# Patient Record
Sex: FEMALE | Race: WHITE | NOT HISPANIC OR LATINO | Employment: OTHER | URBAN - NONMETROPOLITAN AREA
[De-identification: names, ages, dates, MRNs, and addresses within clinical notes are randomized per-mention and may not be internally consistent; named-entity substitution may affect disease eponyms.]

---

## 2017-02-02 ENCOUNTER — OFFICE VISIT (OUTPATIENT)
Dept: OBSTETRICS AND GYNECOLOGY | Facility: CLINIC | Age: 82
End: 2017-02-02

## 2017-02-02 VITALS
BODY MASS INDEX: 18.55 KG/M2 | DIASTOLIC BLOOD PRESSURE: 70 MMHG | HEIGHT: 59 IN | SYSTOLIC BLOOD PRESSURE: 110 MMHG | WEIGHT: 92 LBS

## 2017-02-02 DIAGNOSIS — N99.3 VAGINAL VAULT PROLAPSE AFTER HYSTERECTOMY: ICD-10-CM

## 2017-02-02 DIAGNOSIS — Z46.89 ENCOUNTER FOR PESSARY MAINTENANCE: Primary | ICD-10-CM

## 2017-02-02 PROCEDURE — 99213 OFFICE O/P EST LOW 20 MIN: CPT | Performed by: NURSE PRACTITIONER

## 2017-02-02 RX ORDER — MELOXICAM 7.5 MG/1
TABLET ORAL
Refills: 5 | COMMUNITY
Start: 2017-01-19

## 2017-02-02 NOTE — PROGRESS NOTES
"    María Elena Olmstead is a 89 y.o. No obstetric history on file.     Chief Complaint   Patient presents with   • Pessary Check     Pt is here today for Pessary Maintence           HPI - María Elena Albarado is in today for pessary maintenance.  She wears a Patten folding  Pessary and  is doing well with her pessary.  She is   In [facility] from being in the hospital.      The following portions of the patient's history were reviewed and updated as appropriate:vital signs, allergies, current medications, past family history, past medical history, past social history, past surgical history and problem list.    Review of Systems   Constitutional: Positive for activity change and fatigue. Negative for diaphoresis.   HENT: Negative for congestion, ear discharge and postnasal drip.    Eyes: Negative for redness and itching.   Respiratory: Negative for apnea and choking.    Cardiovascular: Negative for chest pain.   Gastrointestinal: Negative for abdominal distention.        Recent c dif   Endocrine: Negative for cold intolerance and heat intolerance.   Genitourinary: Negative for difficulty urinating, enuresis, genital sores, pelvic pain, vaginal bleeding and vaginal pain.   Musculoskeletal: Positive for arthralgias, gait problem, neck pain and neck stiffness.        Severe  rheumatoid   Neurological: Negative for dizziness, tremors and speech difficulty.   Psychiatric/Behavioral: Negative for agitation and dysphoric mood. The patient is not nervous/anxious.                 Visit Vitals   • /70 (BP Location: Right arm, Patient Position: Sitting, Cuff Size: Adult)   • Ht 59\" (149.9 cm)   • Wt 92 lb (41.7 kg)   • Breastfeeding No   • BMI 18.58 kg/m2         Physical Exam   Constitutional: She is oriented to person, place, and time. She appears well-developed and well-nourished. She has a sickly appearance. No distress.   Frail   HENT:   Head: Normocephalic and atraumatic.   Eyes: Conjunctivae are normal. Left " eye exhibits no discharge. No scleral icterus.   Neck: Normal range of motion. Neck supple. No thyromegaly present.   Cardiovascular: Normal rate and regular rhythm.    No murmur heard.  Pulmonary/Chest: Effort normal and breath sounds normal. No respiratory distress. She has no wheezes.   Abdominal: Soft. She exhibits no distension and no mass. There is no tenderness. There is no guarding. Hernia confirmed negative in the right inguinal area and confirmed negative in the left inguinal area.   Genitourinary: Rectal exam shows no mass. Pelvic exam was performed with patient supine. There is no rash, tenderness or lesion on the right labia. There is no rash, tenderness or lesion on the left labia. Right adnexum displays no mass and no tenderness. Left adnexum displays no mass and no tenderness. There is erythema in the vagina. No foreign body in the vagina. No signs of injury around the vagina. No vaginal discharge found.   Genitourinary Comments: B/S/U  Without lesions  Urethra atrophic and  Relaxed  Perineum  Normal  Urethral meatus atrophic   Vagina  Relaxed , no lesions  Rectum  No masses  Bladder  nontender    Vaginal vault prolaps   Musculoskeletal: She exhibits edema, tenderness and deformity.   Neurological: She is alert and oriented to person, place, and time.   Skin: Skin is warm and dry. She is not diaphoretic.   Psychiatric: Her mood appears not anxious. Her affect is not blunt, not labile and not inappropriate. She does not exhibit a depressed mood.   Nursing note and vitals reviewed.       Assessment/Plan     Diagnoses and all orders for this visit:    Encounter for pessary maintenance   (SUZIE)    Vaginal vault prolapse after hysterectomy    BMI less than 19,adult    Patient is seen for pessary main.  Patient denies any problems.  There no areas of excoriation, there are no lesion.  The tissues are atrophic.  Pessary is removed.  The vault is cleaned with water  Pessary is reinserted.    rto 3   elif Bolden, APRN  2/2/2017

## 2017-02-16 RX ORDER — CONJUGATED ESTROGENS 0.62 MG/G
CREAM VAGINAL
Qty: 30 G | Refills: 3 | Status: SHIPPED | OUTPATIENT
Start: 2017-02-16 | End: 2017-05-12 | Stop reason: SDUPTHER

## 2017-03-01 ENCOUNTER — HOSPITAL ENCOUNTER (OUTPATIENT)
Dept: CT IMAGING | Age: 82
Discharge: HOME | End: 2017-03-01
Payer: MEDICARE

## 2017-03-01 DIAGNOSIS — R42 DIZZINESS: ICD-10-CM

## 2017-03-01 DIAGNOSIS — R51.9 PRESSURE IN HEAD: ICD-10-CM

## 2017-03-01 PROCEDURE — 70450 CT HEAD/BRAIN W/O DYE: CPT

## 2017-03-13 ENCOUNTER — OFFICE VISIT (OUTPATIENT)
Dept: OTOLARYNGOLOGY | Facility: CLINIC | Age: 82
End: 2017-03-13

## 2017-03-13 VITALS
SYSTOLIC BLOOD PRESSURE: 110 MMHG | WEIGHT: 97 LBS | TEMPERATURE: 98 F | HEART RATE: 80 BPM | BODY MASS INDEX: 19.56 KG/M2 | DIASTOLIC BLOOD PRESSURE: 80 MMHG | HEIGHT: 59 IN

## 2017-03-13 DIAGNOSIS — H90.5 HEARING LOSS, SENSORINEURAL: ICD-10-CM

## 2017-03-13 DIAGNOSIS — T41.3X1A: Primary | ICD-10-CM

## 2017-03-13 DIAGNOSIS — H81.10 BPPV (BENIGN PAROXYSMAL POSITIONAL VERTIGO), UNSPECIFIED LATERALITY: ICD-10-CM

## 2017-03-13 DIAGNOSIS — R42 VERTIGO: ICD-10-CM

## 2017-03-13 PROCEDURE — 99213 OFFICE O/P EST LOW 20 MIN: CPT | Performed by: PHYSICIAN ASSISTANT

## 2017-03-13 RX ORDER — GABAPENTIN 100 MG/1
CAPSULE ORAL
Refills: 4 | COMMUNITY
Start: 2017-02-28 | End: 2017-10-17

## 2017-03-13 NOTE — PROGRESS NOTES
Patient Care Team:  Kindra Day MD as PCP - General (Internal Medicine)    Chief Complaint   Patient presents with   • Follow-up     ears, cerumen        Subjective     María Elena Olmstead is a 89 y.o. female who presents for evaluation.  HPI Comments: Patient presents for follow-up evaluation of ears and cerumen check. The patient was last seen in the office four months ago for a routine ear cleaning and had been doing well. She recently got C-diff and has been at an assisted living facility for recovery. She reports over the last several weeks she has had increased vertigo and pressure in the head. She also states she has been using several lidocaine patches at a time due to back and neck pain and wondered if this was safe. She states she has only been using a couple at a time over the last week or so.       Review of Systems  Review of Systems   Constitutional: Negative for activity change, appetite change, chills, diaphoresis, fatigue, fever and unexpected weight change.   HENT: Negative for congestion, dental problem, drooling, ear discharge, ear pain, facial swelling, hearing loss, mouth sores, nosebleeds, postnasal drip, rhinorrhea, sinus pressure, sneezing, sore throat, tinnitus, trouble swallowing and voice change.    Eyes: Negative.    Respiratory: Negative.    Cardiovascular: Negative.    Gastrointestinal: Negative.    Endocrine: Negative.    Skin: Negative.    Allergic/Immunologic: Negative for environmental allergies, food allergies and immunocompromised state.   Neurological: Positive for dizziness, light-headedness and headaches.   Hematological: Negative.    Psychiatric/Behavioral: Negative.        History  Past Medical History   Diagnosis Date   • Arthritis    • CKD (chronic kidney disease)    • Cystocele    • Dizziness    • Dysuria    • Exanthem    • Former smoker    • GERD (gastroesophageal reflux disease)    • Granulation polyp of middle ear      Tympanic membrane   • Hearing loss     • Hypertension    • Insomnia    • Osteoarthritis    • Peripheral neuropathy    • Pessary maintenance    • Rectocele    • Rheumatoid arthritis    • Vaginal abrasion    • Vaginal atrophy    • Vaginal lesion    • Vaginal vault prolapse      Past Surgical History   Procedure Laterality Date   • Shoulder surgery  07/2014     left, torn tendon   • Cataract extraction Bilateral 2007   • Rotator cuff repair Left 1995   • Total knee arthroplasty Right    • Other surgical history       thoroctomy   • Total abdominal hysterectomy     • Tonsillectomy     • Adenoidectomy     • Cataract extraction Bilateral    • Hand surgery     • Lung lobectomy     • Endoscopy N/A 12/7/2016     Procedure: ESOPHAGOGASTRODUODENOSCOPY WITH ANESTHESIA;  Surgeon: Saad Marte MD;  Location: [facility] ENDOSCOPY;  Service:      Family History   Problem Relation Age of Onset   • Heart disease Mother    • Cancer Father      liver   • Cancer Brother      Social History   Substance Use Topics   • Smoking status: Former Smoker   • Smokeless tobacco: Never Used   • Alcohol use No       Current Outpatient Prescriptions:   •  potassium chloride (MICRO-K) 10 MEQ CR capsule, Take 2 capsules by mouth Daily., Disp: , Rfl:   •  Acetaminophen (TYLENOL PO), Take  by mouth., Disp: , Rfl:   •  B Complex Vitamins (VITAMIN B COMPLEX PO), Take  by mouth daily., Disp: , Rfl:   •  eszopiclone (LUNESTA) 3 MG tablet, Take 1 tablet by mouth At Night As Needed (prn insomnia). Take immediately before bedtime, Disp: 10 tablet, Rfl: 0  •  Evening Primrose Oil 1000 MG capsule, Take  by mouth as needed., Disp: , Rfl:   •  gabapentin (NEURONTIN) 100 MG capsule, , Disp: , Rfl: 4  •  lidocaine (LIDODERM) 5 %, Place 3 patches on the skin Daily. Remove & Discard patch within 12 hours or as directed by MD, Disp: , Rfl: 0  •  meloxicam (MOBIC) 7.5 MG tablet, , Disp: , Rfl: 5  •  Multiple Vitamins-Minerals (PRESERVISION AREDS 2 PO), Take  by mouth., Disp: , Rfl:   •  Omega-3 Fatty Acids  (FISH OIL BURP-LESS) 500 MG capsule, Take  by mouth daily., Disp: , Rfl:   •  ondansetron (ZOFRAN) 4 MG tablet, Take 1 tablet by mouth Every 6 (Six) Hours As Needed for nausea or vomiting., Disp: , Rfl: 0  •  pantoprazole (PROTONIX) 40 MG EC tablet, Take 1 tablet by mouth Daily., Disp: , Rfl:   •  PREMARIN 0.625 MG/GM vaginal cream, Apply 1 gram vaginally,  twice weekly as directed, Disp: 30 g, Rfl: 3  •  PROAIR  (90 BASE) MCG/ACT inhaler, , Disp: , Rfl:   •  sodium chloride 1 G tablet, Take  by mouth 3 (three) times a day., Disp: , Rfl:   Allergies:  Iodinated diagnostic agents    Objective     Vital Signs  Temp:  [98 °F (36.7 °C)] 98 °F (36.7 °C)  Heart Rate:  [80] 80  BP: (110)/(80) 110/80    Physical Exam:  Physical Exam   Constitutional: Vital signs are normal. She appears well-developed and well-nourished. No distress.   HENT:   Head: Normocephalic and atraumatic.   Right Ear: Hearing, tympanic membrane, external ear and ear canal normal.   Left Ear: Hearing, tympanic membrane, external ear and ear canal normal.   Eyes: Conjunctivae and EOM are normal. Pupils are equal, round, and reactive to light. No scleral icterus.   Pulmonary/Chest: Effort normal.   Neurological: She is alert. No cranial nerve deficit.   Skin: Skin is warm and dry. No rash noted. She is not diaphoretic. No erythema. No pallor.   Psychiatric: She has a normal mood and affect. Her behavior is normal. Judgment and thought content normal.   Vitals reviewed.      Results Review:   I reviewed the patient's new clinical results.    Assessment/Plan     Problems Addressed this Visit        Nervous and Auditory    Hearing loss, sensorineural    BPPV (benign paroxysmal positional vertigo)       Other    Vertigo    Lidocaine overdose - Primary     Discussed the use of lidocaine patches, and advised only one patch at a time.               My findings and recommendations were discussed and questions were answered. Patient was treated for BPPV in  the assisted living facility, but still feels dizzy. This is likely due to the lidocaine patches. I advised the patient to only use one patch at a time and her symptoms will likely get better.    Return in about 4 months (around 7/13/2017) for Recheck ears.    JESU Laird  03/13/17  2:28 PM

## 2017-05-02 ENCOUNTER — OFFICE VISIT (OUTPATIENT)
Dept: OBSTETRICS AND GYNECOLOGY | Facility: CLINIC | Age: 82
End: 2017-05-02

## 2017-05-02 VITALS
HEIGHT: 59 IN | SYSTOLIC BLOOD PRESSURE: 114 MMHG | DIASTOLIC BLOOD PRESSURE: 72 MMHG | WEIGHT: 97 LBS | BODY MASS INDEX: 19.56 KG/M2

## 2017-05-02 DIAGNOSIS — N81.9 VAGINAL VAULT PROLAPSE: Primary | ICD-10-CM

## 2017-05-02 DIAGNOSIS — Z46.89 PESSARY MAINTENANCE: ICD-10-CM

## 2017-05-02 PROCEDURE — 99213 OFFICE O/P EST LOW 20 MIN: CPT | Performed by: NURSE PRACTITIONER

## 2017-05-02 RX ORDER — LOSARTAN POTASSIUM 50 MG/1
TABLET ORAL
COMMUNITY
Start: 2017-03-20

## 2017-06-08 ENCOUNTER — TELEPHONE (OUTPATIENT)
Dept: INTERNAL MEDICINE | Age: 82
End: 2017-06-08

## 2017-06-12 ENCOUNTER — TELEPHONE (OUTPATIENT)
Dept: INTERNAL MEDICINE | Age: 82
End: 2017-06-12

## 2017-06-14 ENCOUNTER — TELEPHONE (OUTPATIENT)
Dept: INTERNAL MEDICINE | Age: 82
End: 2017-06-14

## 2017-06-15 ENCOUNTER — OFFICE VISIT (OUTPATIENT)
Dept: INTERNAL MEDICINE | Age: 82
End: 2017-06-15
Payer: MEDICARE

## 2017-06-15 VITALS
WEIGHT: 97.5 LBS | HEIGHT: 59 IN | DIASTOLIC BLOOD PRESSURE: 74 MMHG | HEART RATE: 81 BPM | SYSTOLIC BLOOD PRESSURE: 118 MMHG | OXYGEN SATURATION: 97 % | BODY MASS INDEX: 19.66 KG/M2

## 2017-06-15 DIAGNOSIS — K21.9 GASTROESOPHAGEAL REFLUX DISEASE WITHOUT ESOPHAGITIS: ICD-10-CM

## 2017-06-15 DIAGNOSIS — E87.1 HYPONATREMIA: ICD-10-CM

## 2017-06-15 DIAGNOSIS — H61.893 EAR CANAL DRYNESS, BILATERAL: ICD-10-CM

## 2017-06-15 DIAGNOSIS — M19.90 ARTHRITIS: ICD-10-CM

## 2017-06-15 DIAGNOSIS — N18.9 CHRONIC KIDNEY DISEASE, UNSPECIFIED STAGE: ICD-10-CM

## 2017-06-15 DIAGNOSIS — N18.30 CKD (CHRONIC KIDNEY DISEASE) STAGE 3, GFR 30-59 ML/MIN (HCC): ICD-10-CM

## 2017-06-15 DIAGNOSIS — Z00.00 ROUTINE ADULT HEALTH MAINTENANCE: Primary | ICD-10-CM

## 2017-06-15 DIAGNOSIS — G47.00 INSOMNIA, UNSPECIFIED TYPE: ICD-10-CM

## 2017-06-15 DIAGNOSIS — I10 ESSENTIAL HYPERTENSION: ICD-10-CM

## 2017-06-15 DIAGNOSIS — N89.8 VAGINAL DRYNESS: ICD-10-CM

## 2017-06-15 PROCEDURE — 1123F ACP DISCUSS/DSCN MKR DOCD: CPT | Performed by: INTERNAL MEDICINE

## 2017-06-15 PROCEDURE — G8427 DOCREV CUR MEDS BY ELIG CLIN: HCPCS | Performed by: INTERNAL MEDICINE

## 2017-06-15 PROCEDURE — 4040F PNEUMOC VAC/ADMIN/RCVD: CPT | Performed by: INTERNAL MEDICINE

## 2017-06-15 PROCEDURE — G8420 CALC BMI NORM PARAMETERS: HCPCS | Performed by: INTERNAL MEDICINE

## 2017-06-15 PROCEDURE — 4004F PT TOBACCO SCREEN RCVD TLK: CPT | Performed by: INTERNAL MEDICINE

## 2017-06-15 PROCEDURE — 1090F PRES/ABSN URINE INCON ASSESS: CPT | Performed by: INTERNAL MEDICINE

## 2017-06-15 PROCEDURE — 99214 OFFICE O/P EST MOD 30 MIN: CPT | Performed by: INTERNAL MEDICINE

## 2017-06-15 RX ORDER — SODIUM CHLORIDE 1000 MG
4 TABLET, SOLUBLE MISCELLANEOUS
COMMUNITY

## 2017-06-15 RX ORDER — VITAMIN B COMPLEX
CAPSULE ORAL
COMMUNITY

## 2017-06-15 RX ORDER — LOSARTAN POTASSIUM 50 MG/1
TABLET ORAL
COMMUNITY
Start: 2017-03-20 | End: 2017-10-02 | Stop reason: SDUPTHER

## 2017-06-15 RX ORDER — PANTOPRAZOLE SODIUM 40 MG/1
40 TABLET, DELAYED RELEASE ORAL
COMMUNITY
Start: 2016-12-21 | End: 2017-11-29 | Stop reason: SDUPTHER

## 2017-06-15 RX ORDER — ONDANSETRON 4 MG/1
4 TABLET, FILM COATED ORAL
COMMUNITY
Start: 2016-12-21 | End: 2017-12-12 | Stop reason: SDUPTHER

## 2017-06-15 RX ORDER — PREDNISONE 1 MG/1
TABLET ORAL
COMMUNITY
Start: 2017-05-01

## 2017-06-15 RX ORDER — GABAPENTIN 100 MG/1
CAPSULE ORAL
COMMUNITY
Start: 2017-02-28 | End: 2017-12-12 | Stop reason: HOSPADM

## 2017-06-15 RX ORDER — MELOXICAM 7.5 MG/1
TABLET ORAL
COMMUNITY
Start: 2017-01-19 | End: 2017-12-12 | Stop reason: SINTOL

## 2017-06-15 RX ORDER — ESZOPICLONE 3 MG/1
3 TABLET, FILM COATED ORAL
COMMUNITY
Start: 2016-12-21 | End: 2017-08-24 | Stop reason: SDUPTHER

## 2017-06-15 RX ORDER — LIDOCAINE 50 MG/G
3 PATCH TOPICAL
COMMUNITY
Start: 2016-12-21

## 2017-06-15 RX ORDER — ACETAMINOPHEN 325 MG/1
TABLET ORAL
COMMUNITY

## 2017-06-15 RX ORDER — CIPROFLOXACIN AND DEXAMETHASONE 3; 1 MG/ML; MG/ML
4 SUSPENSION/ DROPS AURICULAR (OTIC) 2 TIMES DAILY
Qty: 1 BOTTLE | Refills: 1 | Status: SHIPPED | OUTPATIENT
Start: 2017-06-15 | End: 2017-06-22

## 2017-06-15 ASSESSMENT — ENCOUNTER SYMPTOMS
ABDOMINAL PAIN: 0
VOMITING: 0
BLOOD IN STOOL: 0
NAUSEA: 0
DIARRHEA: 0

## 2017-06-19 ENCOUNTER — TELEPHONE (OUTPATIENT)
Dept: OBSTETRICS AND GYNECOLOGY | Facility: CLINIC | Age: 82
End: 2017-06-19

## 2017-06-19 ENCOUNTER — DOCUMENTATION (OUTPATIENT)
Dept: OBSTETRICS AND GYNECOLOGY | Facility: CLINIC | Age: 82
End: 2017-06-19

## 2017-06-22 ENCOUNTER — OFFICE VISIT (OUTPATIENT)
Dept: OBSTETRICS AND GYNECOLOGY | Facility: CLINIC | Age: 82
End: 2017-06-22

## 2017-06-22 VITALS
WEIGHT: 97 LBS | SYSTOLIC BLOOD PRESSURE: 110 MMHG | HEIGHT: 59 IN | BODY MASS INDEX: 19.56 KG/M2 | DIASTOLIC BLOOD PRESSURE: 68 MMHG

## 2017-06-22 DIAGNOSIS — Z46.89 PESSARY MAINTENANCE: Primary | ICD-10-CM

## 2017-06-22 DIAGNOSIS — IMO0001 CONTUSION SHOULDER/ARM, RIGHT, INITIAL ENCOUNTER: ICD-10-CM

## 2017-06-22 DIAGNOSIS — N81.9 VAGINAL VAULT PROLAPSE: ICD-10-CM

## 2017-06-22 DIAGNOSIS — N64.4 MASTALGIA: ICD-10-CM

## 2017-06-22 PROBLEM — I10 BP (HIGH BLOOD PRESSURE): Status: ACTIVE | Noted: 2017-06-22

## 2017-06-22 PROBLEM — R42 HEAD REVOLVING AROUND: Status: ACTIVE | Noted: 2017-03-13

## 2017-06-22 PROBLEM — R42 DIZZINESS: Status: ACTIVE | Noted: 2017-06-22

## 2017-06-22 PROBLEM — N18.9 CHRONIC KIDNEY DISEASE: Status: ACTIVE | Noted: 2017-06-22

## 2017-06-22 PROBLEM — T41.3X1A: Status: ACTIVE | Noted: 2017-03-13

## 2017-06-22 PROBLEM — G64 DISORDER OF PERIPHERAL NERVOUS SYSTEM: Status: ACTIVE | Noted: 2017-06-22

## 2017-06-22 PROCEDURE — 99214 OFFICE O/P EST MOD 30 MIN: CPT | Performed by: NURSE PRACTITIONER

## 2017-06-22 RX ORDER — PREDNISONE 1 MG/1
TABLET ORAL
COMMUNITY
Start: 2017-06-16

## 2017-06-22 RX ORDER — PREDNISOLONE ACETATE 10 MG/ML
SUSPENSION/ DROPS OPHTHALMIC
COMMUNITY
Start: 2017-06-13 | End: 2017-10-17

## 2017-06-22 NOTE — PROGRESS NOTES
"      María Elena Olmstead is a 89 y.o. No obstetric history on file.     Chief Complaint   Patient presents with   • Breast Problem     Pt is here today with c/o having large red spot on the right side and both breast are sore and have been for weeks now   • Pessary Check     Pt is also needing her Pessary maintence done            HPI - María Elena Albarado is in today for pessary maintenance.She wears a Patten for vaginal vault prolaps. This pessary is working very well for her.   She has had extreme bilateral breast tenderness onset about a week ago.She states her nipples were so tender she could barely stand for her bra to touch them.  She denies any skin changes or nipple discharge..Her last mammogram was Sept. of 2016.  She reports a large red area on her \"shoulder.\"      The following portions of the patient's history were reviewed and updated as appropriate:vital signs, allergies, current medications, past family history, past medical history, past social history, past surgical history and problem list.    Review of Systems   Constitutional: Negative for activity change, appetite change, diaphoresis and fever.   HENT: Negative for congestion, dental problem, drooling, ear discharge, hearing loss, mouth sores and sinus pressure.    Eyes: Negative for photophobia, pain, discharge and itching.   Respiratory: Negative for apnea, choking and shortness of breath.    Cardiovascular: Negative for chest pain, palpitations and leg swelling.   Gastrointestinal: Negative for abdominal distention, abdominal pain, blood in stool, constipation, diarrhea and nausea.   Endocrine: Negative for cold intolerance, heat intolerance and polydipsia.   Genitourinary: Negative for difficulty urinating, dysuria, enuresis, flank pain, genital sores, vaginal bleeding and vaginal discharge.   Musculoskeletal: Positive for arthralgias, back pain, gait problem, joint swelling, myalgias, neck pain and neck stiffness.        Severe Rheumatoid   Skin: " "Negative for color change and pallor.   Allergic/Immunologic: Negative for environmental allergies and food allergies.   Neurological: Negative for dizziness, tremors, speech difficulty, light-headedness, numbness and headaches.   Psychiatric/Behavioral: Negative for agitation, confusion, hallucinations and self-injury. The patient is not hyperactive.      BREASTS    VERY TENDER   NIPPLES TENDER  THEY ARE A LITTLE LESS TENDER TODAY           /68 (BP Location: Right arm, Patient Position: Sitting, Cuff Size: Adult)  Ht 59\" (149.9 cm)  Wt 97 lb (44 kg)  BMI 19.59 kg/m2      Physical Exam   Constitutional: She is oriented to person, place, and time. She appears well-developed and well-nourished.   fRAIL  MULTIPLE JOINT DISTORTIONS FROM RHEUMATOID ARTHRITIS   HENT:   Head: Normocephalic and atraumatic.   Eyes: Conjunctivae are normal. Right eye exhibits no discharge. Left eye exhibits no discharge. No scleral icterus.   Neck: Normal range of motion. Neck supple. No thyromegaly present.   Cardiovascular: Normal rate and regular rhythm.    No murmur heard.  Pulmonary/Chest: Effort normal and breath sounds normal. No respiratory distress. She has no wheezes. Right breast exhibits nipple discharge and tenderness. Right breast exhibits no mass and no skin change. Left breast exhibits tenderness. Left breast exhibits no mass, no nipple discharge and no skin change.   The breast exam show no palpable masses, skin changed or discharge.  The breasts ae tender bilaterally.   Abdominal: Soft. She exhibits no distension and no mass. There is no tenderness. There is no guarding. Hernia confirmed negative in the right inguinal area and confirmed negative in the left inguinal area.   Genitourinary: Rectal exam shows no mass. There is no rash, tenderness or lesion on the right labia. There is no rash, tenderness or lesion on the left labia. Right adnexum displays no mass and no tenderness. Left adnexum displays no mass and no " tenderness. There is erythema and tenderness in the vagina. No bleeding in the vagina. No foreign body in the vagina. No signs of injury around the vagina. No vaginal discharge found.   Genitourinary Comments: B/S/U  Without lesions  Urethra  NONTENDER  Perineum  RELAXED  Urethral meatus  EVERSION  Vagina  Normal RELAXED  Rectum  No masses  Bladder  nontender   Neurological: She is alert and oriented to person, place, and time.   Skin: Skin is warm and dry.   LARGE DISCOLORED (RED)  AREA NOTED ON THE RIGHT SIDE    EXTENDS FROM THE TOP OF HER RIGHT SHOULDER  TO JUST ABOVE HER RIGHT BREAST. THERE IS NO BREAK IN THE SKIN OR RASH.  SHE STATES HER SHOULDER ALWAYS HURTS ANYWAY FROM THE ARTHRITIS. IT HAS APPEARANCE OF A RESOLVING BRUISE.  SHE STATES SHE DID NOT HIT THIS AREA OR FALL. SHE STATES IT HAS DONE THIS BEFORE. HER RANGE OF MOTION IS ALWAYS LIMITED.      Psychiatric: She has a normal mood and affect. Her behavior is normal. Her mood appears not anxious. Her affect is not blunt, not labile and not inappropriate. She does not exhibit a depressed mood.   Nursing note and vitals reviewed.       Assessment/Plan     María Elena was seen today for breast problem and pessary check.        Diagnoses and all orders for this visit:                  Contusion shoulder/arm, right, initial encounter  María Elena Albarado mentions her shoulder.  It appears to be a contusion/bruise that is evident but resolving. The shoulder does not appear displaced, her ROM is limited due to severe rheumatoid arthritis. She states she did not hit her shoulder or fall. María Elena Albarado is very alert and lucid. She is a good historian.  She states this has happened before. I advised her to see her PCP for evaluation (Dr. Giordano).  There is no evidence of any acute findings.        Pessary maintenance    Patient is seen for pessary main.  Patient denies any problems.  There no areas of excoriation, there are no lesion.  The tissues are atrophic.  Pessary is removed.  The  vault is cleaned with water and swabs   Pessary is reinserted.  (SUZIE)      Vaginal vault prolapse    Mastalgia  Breast exam is normal other  than tenderness.  Since tenderness is improving, will continue EPO and report if tenderness doesn't resolve in 1 week. Mammogram will be obtained.    Patient is advised to see PCP re: shoulder.  It does not appear to be displaced.               Ophelia Bolden, RANJIT  6/22/2017

## 2017-06-26 ENCOUNTER — TELEPHONE (OUTPATIENT)
Dept: INTERNAL MEDICINE | Age: 82
End: 2017-06-26

## 2017-07-14 ENCOUNTER — OFFICE VISIT (OUTPATIENT)
Dept: INTERNAL MEDICINE | Age: 82
End: 2017-07-14
Payer: MEDICARE

## 2017-07-14 VITALS
OXYGEN SATURATION: 95 % | HEART RATE: 105 BPM | HEIGHT: 60 IN | WEIGHT: 97.5 LBS | SYSTOLIC BLOOD PRESSURE: 128 MMHG | DIASTOLIC BLOOD PRESSURE: 68 MMHG | BODY MASS INDEX: 19.14 KG/M2

## 2017-07-14 DIAGNOSIS — T14.8XXA BRUISING: Primary | ICD-10-CM

## 2017-07-14 DIAGNOSIS — M25.511 CHRONIC PAIN OF BOTH SHOULDERS: ICD-10-CM

## 2017-07-14 DIAGNOSIS — M25.512 CHRONIC PAIN OF BOTH SHOULDERS: ICD-10-CM

## 2017-07-14 DIAGNOSIS — G89.29 CHRONIC MIDLINE LOW BACK PAIN WITHOUT SCIATICA: ICD-10-CM

## 2017-07-14 DIAGNOSIS — T14.8XXA BRUISING: ICD-10-CM

## 2017-07-14 DIAGNOSIS — G89.29 CHRONIC PAIN OF BOTH SHOULDERS: ICD-10-CM

## 2017-07-14 DIAGNOSIS — E87.1 HYPONATREMIA: ICD-10-CM

## 2017-07-14 DIAGNOSIS — M54.50 CHRONIC MIDLINE LOW BACK PAIN WITHOUT SCIATICA: ICD-10-CM

## 2017-07-14 LAB
ANION GAP SERPL CALCULATED.3IONS-SCNC: 16 MMOL/L (ref 7–19)
BUN BLDV-MCNC: 22 MG/DL (ref 8–23)
CALCIUM SERPL-MCNC: 9.6 MG/DL (ref 8.2–9.6)
CHLORIDE BLD-SCNC: 96 MMOL/L (ref 98–111)
CO2: 24 MMOL/L (ref 22–29)
CREAT SERPL-MCNC: 0.6 MG/DL (ref 0.5–0.9)
GFR NON-AFRICAN AMERICAN: >60
GLUCOSE BLD-MCNC: 115 MG/DL (ref 74–109)
HCT VFR BLD CALC: 36.5 % (ref 37–47)
HEMOGLOBIN: 11.6 G/DL (ref 12–16)
INR BLD: 0.9 (ref 0.88–1.18)
MCH RBC QN AUTO: 31.8 PG (ref 27–31)
MCHC RBC AUTO-ENTMCNC: 31.8 G/DL (ref 33–37)
MCV RBC AUTO: 100 FL (ref 81–99)
PDW BLD-RTO: 14.7 % (ref 11.5–14.5)
PLATELET # BLD: 307 K/UL (ref 130–400)
PMV BLD AUTO: 10 FL (ref 9.4–12.3)
POTASSIUM SERPL-SCNC: 4.4 MMOL/L (ref 3.5–5)
PROTHROMBIN TIME: 12 SEC (ref 12–14.6)
RBC # BLD: 3.65 M/UL (ref 4.2–5.4)
SODIUM BLD-SCNC: 136 MMOL/L (ref 136–145)
WBC # BLD: 8 K/UL (ref 4.8–10.8)

## 2017-07-14 PROCEDURE — 1123F ACP DISCUSS/DSCN MKR DOCD: CPT | Performed by: INTERNAL MEDICINE

## 2017-07-14 PROCEDURE — 1090F PRES/ABSN URINE INCON ASSESS: CPT | Performed by: INTERNAL MEDICINE

## 2017-07-14 PROCEDURE — 4040F PNEUMOC VAC/ADMIN/RCVD: CPT | Performed by: INTERNAL MEDICINE

## 2017-07-14 PROCEDURE — G8428 CUR MEDS NOT DOCUMENT: HCPCS | Performed by: INTERNAL MEDICINE

## 2017-07-14 PROCEDURE — 99214 OFFICE O/P EST MOD 30 MIN: CPT | Performed by: INTERNAL MEDICINE

## 2017-07-14 PROCEDURE — 1036F TOBACCO NON-USER: CPT | Performed by: INTERNAL MEDICINE

## 2017-07-14 PROCEDURE — G8420 CALC BMI NORM PARAMETERS: HCPCS | Performed by: INTERNAL MEDICINE

## 2017-07-16 ENCOUNTER — TELEPHONE (OUTPATIENT)
Dept: INTERNAL MEDICINE | Age: 82
End: 2017-07-16

## 2017-07-16 ASSESSMENT — ENCOUNTER SYMPTOMS
COLOR CHANGE: 0
EYE ITCHING: 0
SINUS PRESSURE: 0
VOMITING: 0
NAUSEA: 0
PHOTOPHOBIA: 0
EYE REDNESS: 0
BLOOD IN STOOL: 0
SHORTNESS OF BREATH: 0
CONSTIPATION: 0
BACK PAIN: 1
ABDOMINAL PAIN: 0
COUGH: 0
EYE DISCHARGE: 0
SORE THROAT: 0
ABDOMINAL DISTENTION: 0
WHEEZING: 0
RHINORRHEA: 0
EYE PAIN: 0
DIARRHEA: 0

## 2017-07-20 ENCOUNTER — TELEPHONE (OUTPATIENT)
Dept: INTERNAL MEDICINE | Age: 82
End: 2017-07-20

## 2017-07-26 ENCOUNTER — TELEPHONE (OUTPATIENT)
Dept: INTERNAL MEDICINE | Age: 82
End: 2017-07-26

## 2017-07-27 ENCOUNTER — TELEPHONE (OUTPATIENT)
Dept: INTERNAL MEDICINE | Age: 82
End: 2017-07-27

## 2017-07-27 DIAGNOSIS — R93.89 ABNORMAL X-RAY: ICD-10-CM

## 2017-08-08 ENCOUNTER — OFFICE VISIT (OUTPATIENT)
Dept: INTERNAL MEDICINE | Age: 82
End: 2017-08-08
Payer: MEDICARE

## 2017-08-08 VITALS
DIASTOLIC BLOOD PRESSURE: 64 MMHG | HEART RATE: 87 BPM | TEMPERATURE: 98.7 F | BODY MASS INDEX: 19.56 KG/M2 | SYSTOLIC BLOOD PRESSURE: 122 MMHG | HEIGHT: 59 IN | OXYGEN SATURATION: 96 % | WEIGHT: 97 LBS

## 2017-08-08 DIAGNOSIS — M15.9 PRIMARY OSTEOARTHRITIS INVOLVING MULTIPLE JOINTS: Chronic | ICD-10-CM

## 2017-08-08 DIAGNOSIS — G89.29 CHRONIC RIGHT SHOULDER PAIN: Primary | ICD-10-CM

## 2017-08-08 DIAGNOSIS — M25.511 CHRONIC RIGHT SHOULDER PAIN: Primary | ICD-10-CM

## 2017-08-08 DIAGNOSIS — M25.511 CHRONIC RIGHT SHOULDER PAIN: ICD-10-CM

## 2017-08-08 DIAGNOSIS — G89.29 CHRONIC RIGHT SHOULDER PAIN: ICD-10-CM

## 2017-08-08 PROBLEM — M19.90 OSTEOARTHRITIS: Chronic | Status: ACTIVE | Noted: 2017-08-08

## 2017-08-08 LAB
BASOPHILS ABSOLUTE: 0 K/UL (ref 0–0.2)
BASOPHILS RELATIVE PERCENT: 0.4 % (ref 0–1)
EOSINOPHILS ABSOLUTE: 0.2 K/UL (ref 0–0.6)
EOSINOPHILS RELATIVE PERCENT: 1.8 % (ref 0–5)
HCT VFR BLD CALC: 34 % (ref 37–47)
HEMOGLOBIN: 10.8 G/DL (ref 12–16)
LYMPHOCYTES ABSOLUTE: 1.5 K/UL (ref 1.1–4.5)
LYMPHOCYTES RELATIVE PERCENT: 15.3 % (ref 20–40)
MCH RBC QN AUTO: 32 PG (ref 27–31)
MCHC RBC AUTO-ENTMCNC: 31.8 G/DL (ref 33–37)
MCV RBC AUTO: 100.9 FL (ref 81–99)
MONOCYTES ABSOLUTE: 0.9 K/UL (ref 0–0.9)
MONOCYTES RELATIVE PERCENT: 9 % (ref 0–10)
NEUTROPHILS ABSOLUTE: 7 K/UL (ref 1.5–7.5)
NEUTROPHILS RELATIVE PERCENT: 73 % (ref 50–65)
PDW BLD-RTO: 14.6 % (ref 11.5–14.5)
PLATELET # BLD: 295 K/UL (ref 130–400)
PMV BLD AUTO: 9.5 FL (ref 9.4–12.3)
RBC # BLD: 3.37 M/UL (ref 4.2–5.4)
WBC # BLD: 9.6 K/UL (ref 4.8–10.8)

## 2017-08-08 PROCEDURE — 1090F PRES/ABSN URINE INCON ASSESS: CPT | Performed by: NURSE PRACTITIONER

## 2017-08-08 PROCEDURE — 1123F ACP DISCUSS/DSCN MKR DOCD: CPT | Performed by: NURSE PRACTITIONER

## 2017-08-08 PROCEDURE — 99214 OFFICE O/P EST MOD 30 MIN: CPT | Performed by: NURSE PRACTITIONER

## 2017-08-08 PROCEDURE — G8427 DOCREV CUR MEDS BY ELIG CLIN: HCPCS | Performed by: NURSE PRACTITIONER

## 2017-08-08 PROCEDURE — 1036F TOBACCO NON-USER: CPT | Performed by: NURSE PRACTITIONER

## 2017-08-08 PROCEDURE — 4040F PNEUMOC VAC/ADMIN/RCVD: CPT | Performed by: NURSE PRACTITIONER

## 2017-08-08 PROCEDURE — G8420 CALC BMI NORM PARAMETERS: HCPCS | Performed by: NURSE PRACTITIONER

## 2017-08-08 ASSESSMENT — ENCOUNTER SYMPTOMS
PHOTOPHOBIA: 0
VOICE CHANGE: 0
VOMITING: 0
RHINORRHEA: 0
COUGH: 0
BACK PAIN: 0
NAUSEA: 0
COLOR CHANGE: 0
SHORTNESS OF BREATH: 0

## 2017-08-22 ENCOUNTER — TELEPHONE (OUTPATIENT)
Dept: INTERNAL MEDICINE | Age: 82
End: 2017-08-22

## 2017-08-24 RX ORDER — ESZOPICLONE 3 MG/1
TABLET, FILM COATED ORAL
Qty: 90 TABLET | Refills: 1 | Status: SHIPPED | OUTPATIENT
Start: 2017-08-24 | End: 2017-08-25 | Stop reason: SDUPTHER

## 2017-08-24 RX ORDER — ESZOPICLONE 3 MG/1
TABLET, FILM COATED ORAL
Qty: 90 TABLET | Refills: 1 | Status: SHIPPED | OUTPATIENT
Start: 2017-08-24 | End: 2017-08-24 | Stop reason: SDUPTHER

## 2017-08-25 RX ORDER — ESZOPICLONE 3 MG/1
TABLET, FILM COATED ORAL
Qty: 90 TABLET | Refills: 1 | Status: SHIPPED | OUTPATIENT
Start: 2017-08-25 | End: 2017-12-01 | Stop reason: SDUPTHER

## 2017-09-18 ENCOUNTER — OFFICE VISIT (OUTPATIENT)
Dept: OBSTETRICS AND GYNECOLOGY | Facility: CLINIC | Age: 82
End: 2017-09-18

## 2017-09-18 VITALS
DIASTOLIC BLOOD PRESSURE: 76 MMHG | WEIGHT: 97 LBS | HEIGHT: 59 IN | SYSTOLIC BLOOD PRESSURE: 134 MMHG | BODY MASS INDEX: 19.56 KG/M2

## 2017-09-18 DIAGNOSIS — Z46.89 PESSARY MAINTENANCE: ICD-10-CM

## 2017-09-18 DIAGNOSIS — R35.0 URINARY FREQUENCY: Primary | ICD-10-CM

## 2017-09-18 DIAGNOSIS — N89.8 VAGINAL LESION: ICD-10-CM

## 2017-09-18 PROCEDURE — 99213 OFFICE O/P EST LOW 20 MIN: CPT | Performed by: NURSE PRACTITIONER

## 2017-09-18 NOTE — PROGRESS NOTES
María Elena Olmstead is a 89 y.o. No obstetric history on file.     Chief Complaint   Patient presents with   • Pessary Check     Patient here today for pessary maintenance. Patient also c/o recently having a UTI.           [facility] - María Elena is in for pessary maintenance.      The following portions of the patient's history were reviewed and updated as appropriate:vital signs, allergies, current medications, past family history, past medical history, past social history, past surgical history and problem list.      Current Outpatient Prescriptions:   •  Acetaminophen (TYLENOL PO), Take  by mouth., Disp: , Rfl:   •  B Complex Vitamins (VITAMIN B COMPLEX PO), Take  by mouth daily., Disp: , Rfl:   •  CIPRODEX 0.3-0.1 % otic suspension, , Disp: , Rfl:   •  conjugated estrogens (PREMARIN) 0.625 MG/GM vaginal cream, Insert  into the vagina Daily. Insert 1/2 gram into the vagina daily, Disp: 30 g, Rfl: 5  •  eszopiclone (LUNESTA) 3 MG tablet, Take 1 tablet by mouth At Night As Needed (prn insomnia). Take immediately before bedtime, Disp: 10 tablet, Rfl: 0  •  Evening Primrose Oil 1000 MG capsule, Take  by mouth as needed., Disp: , Rfl:   •  gabapentin (NEURONTIN) 100 MG capsule, , Disp: , Rfl: 4  •  lidocaine (LIDODERM) 5 %, Place 3 patches on the skin Daily. Remove & Discard patch within 12 hours or as directed by MD, Disp: , Rfl: 0  •  losartan (COZAAR) 50 MG tablet, , Disp: , Rfl:   •  meloxicam (MOBIC) 7.5 MG tablet, , Disp: , Rfl: 5  •  ondansetron (ZOFRAN) 4 MG tablet, Take 1 tablet by mouth Every 6 (Six) Hours As Needed for nausea or vomiting., Disp: , Rfl: 0  •  pantoprazole (PROTONIX) 40 MG EC tablet, Take 1 tablet by mouth Daily., Disp: , Rfl:   •  prednisoLONE acetate (PRED FORTE) 1 % ophthalmic suspension, , Disp: , Rfl:   •  predniSONE (DELTASONE) 5 MG tablet, , Disp: , Rfl:   •  PROAIR  (90 BASE) MCG/ACT inhaler, , Disp: , Rfl:   •  sodium chloride 1 G tablet, Take  by mouth 3 (three) times a day., Disp: ,  "Rfl:     Review of Systems   Constitutional: Negative for activity change, appetite change, diaphoresis and fever.   HENT: Negative for congestion, dental problem, drooling, ear discharge, hearing loss, mouth sores and sinus pressure.    Eyes: Negative for photophobia, pain, discharge and itching.   Respiratory: Negative for apnea, choking and shortness of breath.    Cardiovascular: Negative for chest pain, palpitations and leg swelling.   Gastrointestinal: Negative for abdominal distention, abdominal pain, blood in stool, constipation, diarrhea and nausea.   Endocrine: Negative for cold intolerance, heat intolerance and polydipsia.   Genitourinary: Positive for frequency (starts at 3:30 every afternoon). Negative for difficulty urinating, dysuria, enuresis, flank pain, genital sores, vaginal bleeding and vaginal discharge.   Musculoskeletal: Positive for arthralgias, back pain, gait problem, joint swelling, myalgias, neck pain and neck stiffness.        Severe Rheumatoid   Skin: Negative for color change and pallor.   Allergic/Immunologic: Negative for environmental allergies and food allergies.   Neurological: Negative for dizziness, tremors, speech difficulty, light-headedness, numbness and headaches.   Psychiatric/Behavioral: Negative for agitation, confusion, hallucinations and self-injury. The patient is not hyperactive.      Breast ROS: negative    Objective      /76  Ht 59\" (149.9 cm)  Wt 97 lb (44 kg)  BMI 19.59 kg/m2      Physical Exam   Constitutional: She is oriented to person, place, and time. She appears well-developed and well-nourished.   fRAIL  MULTIPLE JOINT DISTORTIONS FROM RHEUMATOID ARTHRITIS   HENT:   Head: Normocephalic and atraumatic.   Eyes: Conjunctivae are normal. Right eye exhibits no discharge. Left eye exhibits no discharge. No scleral icterus.   Neck: Normal range of motion. Neck supple. No thyromegaly present.   Cardiovascular: Normal rate and regular rhythm.    No murmur " heard.  Pulmonary/Chest: Effort normal and breath sounds normal. No respiratory distress. She has no wheezes.        Abdominal: Soft. She exhibits no distension and no mass. There is no tenderness. There is no guarding. Hernia confirmed negative in the right inguinal area and confirmed negative in the left inguinal area.   Genitourinary: Rectal exam shows no mass. There is no rash, tenderness or lesion on the right labia. There is no rash, tenderness or lesion on the left labia. Right adnexum displays no mass and no tenderness. Left adnexum displays no mass and no tenderness. There is erythema and tenderness in the vagina. No bleeding in the vagina. No foreign body in the vagina. No signs of injury around the vagina. No vaginal discharge found.   Genitourinary Comments: B/S/U  Without lesions  Urethra  NONTENDER  Perineum  RELAXED  Urethral meatus  EVERSION  Vagina  Normal RELAXED  Rectum  No masses  Bladder  nontender  Irritated area that blleds small amount at the apex of vagina   Neurological: She is alert and oriented to person, place, and time.   Skin: Skin is warm and dry.          Psychiatric: She has a normal mood and affect. Her behavior is normal. Her mood appears not anxious. Her affect is not blunt, not labile and not inappropriate. She does not exhibit a depressed mood.   Nursing note and vitals reviewed.       Assessment/Plan     ASSESSMENT/PLAN    María Elena was seen today for pessary check.    Diagnoses and all orders for this visit:    Urinary frequency  -     Urinalysis With Microscopic  -     Urine Culture, Comprehen ([facility])      Diagnoses and all orders for this visit:    Urinary frequency  -     Urinalysis With Microscopic  -     Urine Culture, Comprehen ([facility])    Pessary maintenance  Leave pessary out and use Estrogen cream q hs x 7 days the three times per week until I see her again to see if the pessary can be reinserted.    Vaginal lesion          Ophelia Bolden, APRN  9/18/2017

## 2017-09-21 ENCOUNTER — TELEPHONE (OUTPATIENT)
Dept: INTERNAL MEDICINE | Age: 82
End: 2017-09-21

## 2017-09-21 LAB
APPEARANCE UR: CLEAR
BACTERIA #/AREA URNS HPF: ABNORMAL /HPF
BACTERIA UR CULT: ABNORMAL
BILIRUB UR QL STRIP: NEGATIVE
CASTS URNS MICRO: ABNORMAL
COLOR UR: (no result)
EPI CELLS #/AREA URNS HPF: ABNORMAL /HPF
GLUCOSE UR QL: NEGATIVE
HGB UR QL STRIP: NEGATIVE
KETONES UR QL STRIP: NEGATIVE
LEUKOCYTE ESTERASE UR QL STRIP: (no result)
NITRITE UR QL STRIP: NEGATIVE
OTHER ANTIBIOTIC SUSC ISLT: ABNORMAL
PH UR STRIP: 6 [PH] (ref 5–8)
PROT UR QL STRIP: NEGATIVE
RBC #/AREA URNS HPF: ABNORMAL /HPF
SP GR UR: 1.02 (ref 1–1.03)
UROBILINOGEN UR STRIP-MCNC: (no result) MG/DL
WBC #/AREA URNS HPF: ABNORMAL /HPF

## 2017-09-26 ENCOUNTER — TELEPHONE (OUTPATIENT)
Dept: OBSTETRICS AND GYNECOLOGY | Facility: CLINIC | Age: 82
End: 2017-09-26

## 2017-09-26 ENCOUNTER — DOCUMENTATION (OUTPATIENT)
Dept: OBSTETRICS AND GYNECOLOGY | Facility: CLINIC | Age: 82
End: 2017-09-26

## 2017-09-26 ENCOUNTER — TELEPHONE (OUTPATIENT)
Dept: INTERNAL MEDICINE | Age: 82
End: 2017-09-26

## 2017-09-26 DIAGNOSIS — N30.00 ACUTE CYSTITIS WITHOUT HEMATURIA: Primary | ICD-10-CM

## 2017-09-26 RX ORDER — TETRACYCLINE HYDROCHLORIDE 250 MG/1
250 CAPSULE ORAL 4 TIMES DAILY
Qty: 28 CAPSULE | Refills: 1 | Status: SHIPPED | OUTPATIENT
Start: 2017-09-26 | End: 2017-09-27 | Stop reason: RX

## 2017-09-26 NOTE — PROGRESS NOTES
María Elena Albarado's urine culture returns with bacteria (2).  They are both sensitive to Tetracycline.  I discussed this with her PCP's nurse (Dr. Day) and it was approved that she could be given Tetracycline, usual dosage and frequency. (kidney function).  Tetracycline 250  Qid x 7 for UI.  Patient was counseled.  RANJIT Knight

## 2017-09-27 ENCOUNTER — TELEPHONE (OUTPATIENT)
Dept: OBSTETRICS AND GYNECOLOGY | Facility: CLINIC | Age: 82
End: 2017-09-27

## 2017-09-27 DIAGNOSIS — N39.0 URINARY TRACT INFECTION WITHOUT HEMATURIA, SITE UNSPECIFIED: Primary | ICD-10-CM

## 2017-09-27 RX ORDER — DOXYCYCLINE HYCLATE 100 MG/1
100 CAPSULE ORAL 2 TIMES DAILY
Qty: 14 CAPSULE | Refills: 2 | Status: SHIPPED | OUTPATIENT
Start: 2017-09-27 | End: 2017-10-17

## 2017-09-27 NOTE — TELEPHONE ENCOUNTER
Pharmacy has sent 2 requests to change medication from yesterday. She was given tetracycline. Pt has now called wanting to know what medication she is going to be given since nothing has been sent. Please review and advise.

## 2017-09-28 ENCOUNTER — TELEPHONE (OUTPATIENT)
Dept: INTERNAL MEDICINE | Age: 82
End: 2017-09-28

## 2017-09-28 ENCOUNTER — OFFICE VISIT (OUTPATIENT)
Dept: INTERNAL MEDICINE | Age: 82
End: 2017-09-28
Payer: MEDICARE

## 2017-09-28 VITALS
HEART RATE: 93 BPM | SYSTOLIC BLOOD PRESSURE: 135 MMHG | HEIGHT: 60 IN | OXYGEN SATURATION: 96 % | DIASTOLIC BLOOD PRESSURE: 62 MMHG

## 2017-09-28 DIAGNOSIS — K64.9 HEMORRHOIDS, UNSPECIFIED HEMORRHOID TYPE: ICD-10-CM

## 2017-09-28 DIAGNOSIS — M79.661 RIGHT CALF PAIN: Primary | ICD-10-CM

## 2017-09-28 PROCEDURE — 99213 OFFICE O/P EST LOW 20 MIN: CPT | Performed by: INTERNAL MEDICINE

## 2017-09-28 PROCEDURE — 1036F TOBACCO NON-USER: CPT | Performed by: INTERNAL MEDICINE

## 2017-09-28 PROCEDURE — 1123F ACP DISCUSS/DSCN MKR DOCD: CPT | Performed by: INTERNAL MEDICINE

## 2017-09-28 PROCEDURE — G8427 DOCREV CUR MEDS BY ELIG CLIN: HCPCS | Performed by: INTERNAL MEDICINE

## 2017-09-28 PROCEDURE — 4040F PNEUMOC VAC/ADMIN/RCVD: CPT | Performed by: INTERNAL MEDICINE

## 2017-09-28 PROCEDURE — G8420 CALC BMI NORM PARAMETERS: HCPCS | Performed by: INTERNAL MEDICINE

## 2017-09-28 PROCEDURE — 1090F PRES/ABSN URINE INCON ASSESS: CPT | Performed by: INTERNAL MEDICINE

## 2017-09-28 RX ORDER — ACETAMINOPHEN AND CODEINE PHOSPHATE 300; 30 MG/1; MG/1
1-2 TABLET ORAL EVERY 6 HOURS PRN
Qty: 10 TABLET | Refills: 0 | Status: SHIPPED | OUTPATIENT
Start: 2017-09-28

## 2017-09-28 ASSESSMENT — PATIENT HEALTH QUESTIONNAIRE - PHQ9
1. LITTLE INTEREST OR PLEASURE IN DOING THINGS: 0
2. FEELING DOWN, DEPRESSED OR HOPELESS: 0
SUM OF ALL RESPONSES TO PHQ QUESTIONS 1-9: 0
SUM OF ALL RESPONSES TO PHQ9 QUESTIONS 1 & 2: 0

## 2017-09-29 ENCOUNTER — TELEPHONE (OUTPATIENT)
Dept: INTERNAL MEDICINE | Age: 82
End: 2017-09-29

## 2017-09-29 ASSESSMENT — ENCOUNTER SYMPTOMS
WHEEZING: 0
CONSTIPATION: 0
FACIAL SWELLING: 0
PHOTOPHOBIA: 0
ABDOMINAL PAIN: 0
RHINORRHEA: 0
COLOR CHANGE: 0
SHORTNESS OF BREATH: 0
SORE THROAT: 0
COUGH: 0
EYE PAIN: 0
SINUS PRESSURE: 0
EYE ITCHING: 0
ABDOMINAL DISTENTION: 0
VOMITING: 0
EYE REDNESS: 0
DIARRHEA: 0
EYE DISCHARGE: 0
TROUBLE SWALLOWING: 0
CHEST TIGHTNESS: 0
NAUSEA: 0
VOICE CHANGE: 0

## 2017-10-02 RX ORDER — LOSARTAN POTASSIUM 50 MG/1
TABLET ORAL
Qty: 30 TABLET | Refills: 3 | Status: SHIPPED | OUTPATIENT
Start: 2017-10-02 | End: 2017-12-12 | Stop reason: SDUPTHER

## 2017-10-02 RX ORDER — HYDROCORTISONE ACETATE 25 MG/1
25 SUPPOSITORY RECTAL EVERY 12 HOURS
Qty: 14 SUPPOSITORY | Refills: 2 | Status: SHIPPED | OUTPATIENT
Start: 2017-10-02

## 2017-10-10 ENCOUNTER — OFFICE VISIT (OUTPATIENT)
Dept: OBSTETRICS AND GYNECOLOGY | Facility: CLINIC | Age: 82
End: 2017-10-10

## 2017-10-10 VITALS
BODY MASS INDEX: 19.56 KG/M2 | DIASTOLIC BLOOD PRESSURE: 76 MMHG | SYSTOLIC BLOOD PRESSURE: 120 MMHG | HEIGHT: 59 IN | WEIGHT: 97 LBS

## 2017-10-10 DIAGNOSIS — N76.3 SUBACUTE VULVITIS: Primary | ICD-10-CM

## 2017-10-10 DIAGNOSIS — Z46.89 PESSARY MAINTENANCE: ICD-10-CM

## 2017-10-10 PROCEDURE — 99213 OFFICE O/P EST LOW 20 MIN: CPT | Performed by: NURSE PRACTITIONER

## 2017-10-10 NOTE — PROGRESS NOTES
María Elena Olmstead is a 89 y.o. No obstetric history on file.     Chief Complaint   Patient presents with   • Pessary Check     Patient here today to have pessary reinserted.           HPI -Patient is due to irritation from pessary friction.   She has been using estrogen vaginal cream for irritation in the vaginal secondary to the pessary (Patten).  She also just completed RX for UTI that was culture proven.  She c/o irritation at the introitus.      The following portions of the patient's history were reviewed and updated as appropriate:vital signs, allergies, current medications, past family history, past medical history, past social history, past surgical history and problem list.      Current Outpatient Prescriptions:   •  Acetaminophen (TYLENOL PO), Take  by mouth., Disp: , Rfl:   •  B Complex Vitamins (VITAMIN B COMPLEX PO), Take  by mouth daily., Disp: , Rfl:   •  CIPRODEX 0.3-0.1 % otic suspension, , Disp: , Rfl:   •  conjugated estrogens (PREMARIN) 0.625 MG/GM vaginal cream, Insert  into the vagina Daily. Insert 1/2 gram into the vagina daily, Disp: 30 g, Rfl: 5  •  doxycycline (VIBRAMYCIN) 100 MG capsule, Take 1 capsule by mouth 2 (Two) Times a Day., Disp: 14 capsule, Rfl: 2  •  eszopiclone (LUNESTA) 3 MG tablet, Take 1 tablet by mouth At Night As Needed (prn insomnia). Take immediately before bedtime, Disp: 10 tablet, Rfl: 0  •  Evening Primrose Oil 1000 MG capsule, Take  by mouth as needed., Disp: , Rfl:   •  gabapentin (NEURONTIN) 100 MG capsule, , Disp: , Rfl: 4  •  lidocaine (LIDODERM) 5 %, Place 3 patches on the skin Daily. Remove & Discard patch within 12 hours or as directed by MD, Disp: , Rfl: 0  •  losartan (COZAAR) 50 MG tablet, , Disp: , Rfl:   •  meloxicam (MOBIC) 7.5 MG tablet, , Disp: , Rfl: 5  •  ondansetron (ZOFRAN) 4 MG tablet, Take 1 tablet by mouth Every 6 (Six) Hours As Needed for nausea or vomiting., Disp: , Rfl: 0  •  pantoprazole (PROTONIX) 40 MG EC tablet, Take 1 tablet by mouth  "Daily., Disp: , Rfl:   •  prednisoLONE acetate (PRED FORTE) 1 % ophthalmic suspension, , Disp: , Rfl:   •  predniSONE (DELTASONE) 5 MG tablet, , Disp: , Rfl:   •  PROAIR  (90 BASE) MCG/ACT inhaler, , Disp: , Rfl:   •  sodium chloride 1 G tablet, Take  by mouth 3 (three) times a day., Disp: , Rfl:   •  nystatin-triamcinolone (MYCOLOG II) 501982-1.1 UNIT/GM-% cream, Apply  topically 2 (Two) Times a Day. Apply externally  Bid-T  X  7  days, Disp: 1 g, Rfl: 1    Review of Systems   Constitutional: Negative for activity change, appetite change, diaphoresis and fever.   HENT: Negative for congestion, dental problem, drooling, ear discharge, hearing loss, mouth sores and sinus pressure.    Eyes: Negative for photophobia, pain, discharge and itching.   Respiratory: Negative for apnea, choking and shortness of breath.    Cardiovascular: Negative for chest pain, palpitations and leg swelling.   Gastrointestinal: Negative for abdominal distention, abdominal pain, blood in stool, constipation, diarrhea and nausea.   Endocrine: Negative for cold intolerance, heat intolerance and polydipsia.   Genitourinary: Positive for frequency (starts at 3:30 every afternoon). Negative for difficulty urinating, dysuria, enuresis, flank pain, genital sores, vaginal bleeding and vaginal discharge.        Itching and burning at the vaginal introitus   Musculoskeletal: Positive for arthralgias, back pain, gait problem, joint swelling, myalgias, neck pain and neck stiffness.        Severe Rheumatoid   Skin: Negative for color change and pallor.   Allergic/Immunologic: Negative for environmental allergies and food allergies.   Neurological: Negative for dizziness, tremors, speech difficulty, light-headedness, numbness and headaches.   Psychiatric/Behavioral: Negative for agitation, confusion, hallucinations and self-injury. The patient is not hyperactive.      Breast ROS: negative         /76  Ht 59\" (149.9 cm)  Wt 97 lb (44 kg)  " BMI 19.59 kg/m2      Physical Exam   Constitutional: She is oriented to person, place, and time. She appears well-developed and well-nourished.   HENT:   Head: Normocephalic and atraumatic.   Pulmonary/Chest: Effort normal.   Abdominal: Soft.   Musculoskeletal: Normal range of motion. She exhibits edema, tenderness and deformity.   Severe rheumatoid with deformities     Neurological: She is alert and oriented to person, place, and time.   Skin: Skin is warm and dry.   Psychiatric: She has a normal mood and affect. Her behavior is normal.   Nursing note and vitals reviewed.       Assessment/Plan           Diagnoses and all orders for this visit:    Subacute vulvitis  fungal  -     nystatin-triamcinolone (MYCOLOG II) 994490-1.1 UNIT/GM-% cream; Apply  topically 2 (Two) Times a Day. Apply externally  Bid-T  X  7  days    Pessary maintenance  The vaginal tissues appear to be healed and the pessary is placed.    RTO 8 weeks PRN              RANJIT Knight  10/10/2017

## 2017-10-11 DIAGNOSIS — N76.3 SUBACUTE VULVITIS: ICD-10-CM

## 2017-10-11 NOTE — TELEPHONE ENCOUNTER
Pt called stating that her meds were not sent to [facility] [address] as they were ordered yesterday. Checked pt chart and it was sent to her mail order, reordered with the correct pharmacy.

## 2017-10-17 ENCOUNTER — OFFICE VISIT (OUTPATIENT)
Dept: OBSTETRICS AND GYNECOLOGY | Facility: CLINIC | Age: 82
End: 2017-10-17

## 2017-10-17 VITALS
SYSTOLIC BLOOD PRESSURE: 142 MMHG | DIASTOLIC BLOOD PRESSURE: 80 MMHG | HEIGHT: 59 IN | BODY MASS INDEX: 19.56 KG/M2 | WEIGHT: 97 LBS

## 2017-10-17 DIAGNOSIS — R10.2 VAGINAL PAIN: Primary | ICD-10-CM

## 2017-10-17 DIAGNOSIS — Z96.0 VAGINAL PESSARY IN SITU: ICD-10-CM

## 2017-10-17 PROCEDURE — 99213 OFFICE O/P EST LOW 20 MIN: CPT | Performed by: OBSTETRICS & GYNECOLOGY

## 2017-10-17 RX ORDER — ACETAMINOPHEN AND CODEINE PHOSPHATE 300; 30 MG/1; MG/1
TABLET ORAL
COMMUNITY
Start: 2017-10-11

## 2017-10-17 NOTE — PROGRESS NOTES
"María Elena Olmstead is a 89 y.o. female here today for evaluation of vaginal pain.  She has some soreness of the anterior vagina since replacement of her pessary last week.  She denies vaginal bleeding or discharge.  Previously, her pessary had been left out due to the small erosion.    Visit Vitals   • /80 (BP Location: Right arm, Patient Position: Sitting, Cuff Size: Adult)   • Ht 59\" (149.9 cm)   • Wt 97 lb (44 kg)   • Breastfeeding No   • BMI 19.59 kg/m2     Pleasant female no acute distress  Abdomen nontender  Normal external genitalia, I removed her Patten pessary and speculum examination of the vagina reveals no lesions, ulcerations, or erosion.  There is no discharge or blood present.    She requested that I replaced the pessary in a different orientation.  After doing so, she reports much less soreness.    Assessment: Vaginal pain, pessary in place    I see no signs of complication from her pessary today.  She appears to be more comfortable after turning the pessary around, and I would like for her to return in 7-10 days for repeat evaluation to make sure that her symptoms have resolved.    "

## 2017-10-24 ENCOUNTER — OFFICE VISIT (OUTPATIENT)
Dept: OBSTETRICS AND GYNECOLOGY | Facility: CLINIC | Age: 82
End: 2017-10-24

## 2017-10-24 VITALS
HEIGHT: 59 IN | DIASTOLIC BLOOD PRESSURE: 80 MMHG | SYSTOLIC BLOOD PRESSURE: 130 MMHG | BODY MASS INDEX: 19.56 KG/M2 | WEIGHT: 97 LBS

## 2017-10-24 DIAGNOSIS — M54.6 ACUTE RIGHT-SIDED THORACIC BACK PAIN: ICD-10-CM

## 2017-10-24 DIAGNOSIS — Z46.89 PESSARY MAINTENANCE: Primary | ICD-10-CM

## 2017-10-24 PROCEDURE — 99213 OFFICE O/P EST LOW 20 MIN: CPT | Performed by: OBSTETRICS & GYNECOLOGY

## 2017-10-24 NOTE — PROGRESS NOTES
"María Elena Olmstead is a 89 y.o. female here today for follow-up of her pessary.  At her visit last week she requested repositioning of her pessary as she was having some vaginal pain.  Since her last visit she reports that her pain has resolved.  She has been using Premarin cream for a superficial vaginal erosion.  On exam at her last visit, that erosion had cleared up.  She denies vaginal bleeding or discharge today.  Her main complaint is some right-sided mid back pain for the past couple of days.    Visit Vitals   • /80 (BP Location: Right arm, Patient Position: Sitting, Cuff Size: Adult)   • Ht 59\" (149.9 cm)   • Wt 97 lb (44 kg)   • Breastfeeding No   • BMI 19.59 kg/m2     Pleasant female no acute distress  Breathing unlabored  Examination of her back shows significant scoliosis but no rashes or lesions.  The lower thoracic region just to the right of midline is mildly tender to palpation.    Assessment: Back pain, follow-up of pessary    I recommended that she follow up in 2-3 months for a pessary check.  She will use her Premarin cream twice a week for maintenance.  I recommended that she see her PCP if her back pain persists.  She also reports that she is due for a mammogram, and she will schedule one at [facility].  "

## 2017-10-26 ENCOUNTER — TELEPHONE (OUTPATIENT)
Dept: INTERNAL MEDICINE | Age: 82
End: 2017-10-26

## 2017-10-26 DIAGNOSIS — R74.8 ABNORMAL CREATINE KINASE LEVEL: Primary | ICD-10-CM

## 2017-11-02 ENCOUNTER — TELEPHONE (OUTPATIENT)
Dept: INTERNAL MEDICINE | Age: 82
End: 2017-11-02

## 2017-11-02 NOTE — TELEPHONE ENCOUNTER
Pt called and said that the order for her sodium level to get checked each month has not been received at [address] [place] [address] Dr yet. She called this morning and they told her they still havent got it.  Pt said she really needs it done today cause she didn't get to get it checked last month and she thinks it is getting low

## 2017-11-06 DIAGNOSIS — Z12.31 VISIT FOR SCREENING MAMMOGRAM: Primary | ICD-10-CM

## 2017-11-15 ENCOUNTER — OFFICE VISIT (OUTPATIENT)
Dept: OTOLARYNGOLOGY | Facility: CLINIC | Age: 82
End: 2017-11-15

## 2017-11-15 VITALS
DIASTOLIC BLOOD PRESSURE: 80 MMHG | WEIGHT: 97 LBS | HEIGHT: 59 IN | SYSTOLIC BLOOD PRESSURE: 118 MMHG | TEMPERATURE: 97 F | BODY MASS INDEX: 19.56 KG/M2

## 2017-11-15 DIAGNOSIS — H90.3 SENSORINEURAL HEARING LOSS (SNHL) OF BOTH EARS: Primary | ICD-10-CM

## 2017-11-15 DIAGNOSIS — H69.82 DYSFUNCTION OF LEFT EUSTACHIAN TUBE: ICD-10-CM

## 2017-11-15 DIAGNOSIS — H61.23 BILATERAL IMPACTED CERUMEN: ICD-10-CM

## 2017-11-15 PROCEDURE — 69210 REMOVE IMPACTED EAR WAX UNI: CPT | Performed by: PHYSICIAN ASSISTANT

## 2017-11-15 RX ORDER — HYDROCODONE BITARTRATE AND ACETAMINOPHEN 5; 325 MG/1; MG/1
1 TABLET ORAL EVERY 6 HOURS PRN
COMMUNITY

## 2017-11-15 RX ORDER — FLUTICASONE PROPIONATE 50 MCG
2 SPRAY, SUSPENSION (ML) NASAL DAILY
Qty: 16 G | Refills: 11 | Status: SHIPPED | OUTPATIENT
Start: 2017-11-15 | End: 2017-11-16 | Stop reason: SDUPTHER

## 2017-11-15 RX ORDER — MELATONIN
1000 DAILY
COMMUNITY

## 2017-11-15 RX ORDER — CHLORAL HYDRATE 500 MG
CAPSULE ORAL
COMMUNITY

## 2017-11-15 NOTE — PROGRESS NOTES
Procedure Note    Pre-operative Diagnosis: Cerumen impaction/bilateral    Post-operative Diagnosis: same    Anesthesia: none    Procedure:  Binocular ear microscopy with cerumen removal    Procedure Details:    The patient was placed supine on the procedure table. Using a speculum, the ear was examined with a microscope. Cerumen removed with alligator forceps.    Condition:  Stable.  Patient tolerated procedure well.    Complications:  None

## 2017-11-15 NOTE — PATIENT INSTRUCTIONS
Advised to use Afrin before flying and flonase daily. If nose becomes dry start vasoline in the nose and ocean spray. Follow-up as needed.

## 2017-11-16 ENCOUNTER — TELEPHONE (OUTPATIENT)
Dept: OTOLARYNGOLOGY | Facility: CLINIC | Age: 82
End: 2017-11-16

## 2017-11-16 RX ORDER — FLUTICASONE PROPIONATE 50 MCG
2 SPRAY, SUSPENSION (ML) NASAL DAILY
Qty: 16 G | Refills: 11 | Status: SHIPPED | OUTPATIENT
Start: 2017-11-16

## 2017-11-21 ENCOUNTER — TELEPHONE (OUTPATIENT)
Dept: INTERNAL MEDICINE | Age: 82
End: 2017-11-21

## 2017-11-21 DIAGNOSIS — M79.661 RIGHT CALF PAIN: ICD-10-CM

## 2017-11-27 ENCOUNTER — TELEPHONE (OUTPATIENT)
Dept: INTERNAL MEDICINE | Age: 82
End: 2017-11-27

## 2017-12-01 RX ORDER — ESZOPICLONE 3 MG/1
TABLET, FILM COATED ORAL
Qty: 90 TABLET | Refills: 1 | Status: SHIPPED | OUTPATIENT
Start: 2017-12-01 | End: 2017-12-05 | Stop reason: SDUPTHER

## 2017-12-05 ENCOUNTER — OFFICE VISIT (OUTPATIENT)
Dept: OBSTETRICS AND GYNECOLOGY | Facility: CLINIC | Age: 82
End: 2017-12-05

## 2017-12-05 VITALS
HEIGHT: 59 IN | SYSTOLIC BLOOD PRESSURE: 136 MMHG | WEIGHT: 97 LBS | BODY MASS INDEX: 19.56 KG/M2 | DIASTOLIC BLOOD PRESSURE: 80 MMHG

## 2017-12-05 DIAGNOSIS — N39.41 URGE INCONTINENCE: ICD-10-CM

## 2017-12-05 DIAGNOSIS — Z96.0 VAGINAL PESSARY IN SITU: Primary | ICD-10-CM

## 2017-12-05 PROCEDURE — 99213 OFFICE O/P EST LOW 20 MIN: CPT | Performed by: OBSTETRICS & GYNECOLOGY

## 2017-12-05 RX ORDER — SILVER SULFADIAZINE 1 %
CREAM (GRAM) TOPICAL
COMMUNITY
Start: 2017-11-29

## 2017-12-05 RX ORDER — ESZOPICLONE 3 MG/1
TABLET, FILM COATED ORAL
COMMUNITY
Start: 2017-12-01

## 2017-12-05 RX ORDER — ESZOPICLONE 3 MG/1
TABLET, FILM COATED ORAL
Qty: 90 TABLET | Refills: 3 | Status: SHIPPED | OUTPATIENT
Start: 2017-12-05 | End: 2017-12-12 | Stop reason: SDUPTHER

## 2017-12-05 NOTE — PROGRESS NOTES
"María Elena Olmstead is a 89 y.o. female here today to check on her pessary.  She is about to go to [place] for the winter, and wants to make sure that everything is okay.  She feels occasional vaginal bulging, mostly when she is constipated.  She denies vaginal bleeding or discharge.  For the past 12-18 months she has had some urinary urgency with occasional urge incontinence.    Visit Vitals   • /80 (BP Location: Right arm, Patient Position: Sitting, Cuff Size: Adult)   • Ht 149.9 cm (59\")   • Wt 44 kg (97 lb)   • Breastfeeding No   • BMI 19.59 kg/m2     Pleasant female no acute distress  Abdomen nontender  Her Patten pessary is in appropriate position.  There is no prolapse around the pessary, even with Valsalva.    Assessment: Vaginal pessary in place, urinary urgency    She does not feel like she has cystitis and the urinary urgency is a long-standing issue.  She does not drink caffeine and does not want to take an anticholinergic.  I have reassured her that her pessary is in normal position.  I recommend that she have a pessary check in about 3 months.  She will need to see a provider in [place] if she is not back here that time.  "

## 2017-12-12 ENCOUNTER — OFFICE VISIT (OUTPATIENT)
Dept: INTERNAL MEDICINE | Age: 82
End: 2017-12-12
Payer: MEDICARE

## 2017-12-12 VITALS
WEIGHT: 97 LBS | HEART RATE: 109 BPM | BODY MASS INDEX: 19.56 KG/M2 | OXYGEN SATURATION: 97 % | HEIGHT: 59 IN | SYSTOLIC BLOOD PRESSURE: 140 MMHG | DIASTOLIC BLOOD PRESSURE: 72 MMHG

## 2017-12-12 DIAGNOSIS — I10 ESSENTIAL HYPERTENSION: Primary | ICD-10-CM

## 2017-12-12 DIAGNOSIS — H93.90 EAR PROBLEM, UNSPECIFIED LATERALITY: ICD-10-CM

## 2017-12-12 DIAGNOSIS — M19.90 ARTHRITIS: ICD-10-CM

## 2017-12-12 DIAGNOSIS — E78.5 HYPERLIPIDEMIA, UNSPECIFIED HYPERLIPIDEMIA TYPE: ICD-10-CM

## 2017-12-12 DIAGNOSIS — E87.1 HYPONATREMIA: ICD-10-CM

## 2017-12-12 DIAGNOSIS — G47.00 INSOMNIA, UNSPECIFIED TYPE: ICD-10-CM

## 2017-12-12 PROCEDURE — 1036F TOBACCO NON-USER: CPT | Performed by: INTERNAL MEDICINE

## 2017-12-12 PROCEDURE — 1123F ACP DISCUSS/DSCN MKR DOCD: CPT | Performed by: INTERNAL MEDICINE

## 2017-12-12 PROCEDURE — 4040F PNEUMOC VAC/ADMIN/RCVD: CPT | Performed by: INTERNAL MEDICINE

## 2017-12-12 PROCEDURE — G8484 FLU IMMUNIZE NO ADMIN: HCPCS | Performed by: INTERNAL MEDICINE

## 2017-12-12 PROCEDURE — 1090F PRES/ABSN URINE INCON ASSESS: CPT | Performed by: INTERNAL MEDICINE

## 2017-12-12 PROCEDURE — G8427 DOCREV CUR MEDS BY ELIG CLIN: HCPCS | Performed by: INTERNAL MEDICINE

## 2017-12-12 PROCEDURE — 99214 OFFICE O/P EST MOD 30 MIN: CPT | Performed by: INTERNAL MEDICINE

## 2017-12-12 PROCEDURE — G8420 CALC BMI NORM PARAMETERS: HCPCS | Performed by: INTERNAL MEDICINE

## 2017-12-12 RX ORDER — ONDANSETRON 4 MG/1
4 TABLET, FILM COATED ORAL EVERY 8 HOURS PRN
Qty: 30 TABLET | Refills: 1 | Status: SHIPPED | OUTPATIENT
Start: 2017-12-12

## 2017-12-12 RX ORDER — ALBUTEROL SULFATE 90 UG/1
2 AEROSOL, METERED RESPIRATORY (INHALATION) EVERY 6 HOURS PRN
COMMUNITY

## 2017-12-12 RX ORDER — LOSARTAN POTASSIUM 50 MG/1
50 TABLET ORAL DAILY
Qty: 30 TABLET | Refills: 3 | Status: SHIPPED | OUTPATIENT
Start: 2017-12-12 | End: 2018-04-15 | Stop reason: SDUPTHER

## 2017-12-12 RX ORDER — ESZOPICLONE 3 MG/1
3 TABLET, FILM COATED ORAL NIGHTLY
Qty: 90 TABLET | Refills: 3 | Status: SHIPPED | OUTPATIENT
Start: 2017-12-12 | End: 2018-03-13 | Stop reason: SDUPTHER

## 2017-12-12 RX ORDER — PANTOPRAZOLE SODIUM 40 MG/1
40 TABLET, DELAYED RELEASE ORAL DAILY
Qty: 90 TABLET | Refills: 3 | Status: SHIPPED | OUTPATIENT
Start: 2017-12-12

## 2017-12-12 RX ORDER — CLOBETASOL PROPIONATE 0.5 MG/G
OINTMENT TOPICAL
Qty: 1 TUBE | Refills: 1 | Status: SHIPPED | OUTPATIENT
Start: 2017-12-12

## 2017-12-12 RX ORDER — MONTELUKAST SODIUM 4 MG/1
1 TABLET, CHEWABLE ORAL 2 TIMES DAILY
Qty: 60 TABLET | Refills: 3 | Status: SHIPPED | OUTPATIENT
Start: 2017-12-12

## 2017-12-12 ASSESSMENT — ENCOUNTER SYMPTOMS
TROUBLE SWALLOWING: 0
ABDOMINAL PAIN: 0
NAUSEA: 0
RHINORRHEA: 0
SORE THROAT: 0
ABDOMINAL DISTENTION: 0
DIARRHEA: 0
CONSTIPATION: 0
EYE PAIN: 0
EYE DISCHARGE: 0
FACIAL SWELLING: 0
EYE REDNESS: 0
VOMITING: 0
VOICE CHANGE: 0
SHORTNESS OF BREATH: 0
PHOTOPHOBIA: 0
CHEST TIGHTNESS: 0
EYE ITCHING: 0
COLOR CHANGE: 0
COUGH: 0
SINUS PRESSURE: 0
WHEEZING: 0

## 2017-12-12 ASSESSMENT — PATIENT HEALTH QUESTIONNAIRE - PHQ9
1. LITTLE INTEREST OR PLEASURE IN DOING THINGS: 0
SUM OF ALL RESPONSES TO PHQ9 QUESTIONS 1 & 2: 0
SUM OF ALL RESPONSES TO PHQ QUESTIONS 1-9: 0
2. FEELING DOWN, DEPRESSED OR HOPELESS: 0

## 2017-12-12 NOTE — PROGRESS NOTES
redness, itching and visual disturbance. Respiratory: Negative for cough, chest tightness, shortness of breath and wheezing. Cardiovascular: Negative for chest pain, palpitations and leg swelling. Gastrointestinal: Negative for abdominal distention, abdominal pain, constipation, diarrhea, nausea and vomiting. Endocrine: Negative for cold intolerance, heat intolerance, polydipsia, polyphagia and polyuria. Genitourinary: Negative for difficulty urinating, dysuria, flank pain, frequency, hematuria, pelvic pain and urgency. Musculoskeletal:        She has chronic bilateral shoulder pain, chronic low back pain. She reports pain in the right calf area   Skin: Negative for color change, pallor, rash and wound. Allergic/Immunologic: Negative for environmental allergies, food allergies and immunocompromised state. Neurological: Negative for dizziness, tremors, seizures, syncope, facial asymmetry, speech difficulty, weakness, light-headedness, numbness and headaches. Hematological: Negative for adenopathy. Does not bruise/bleed easily. Psychiatric/Behavioral: Negative for behavioral problems, decreased concentration, dysphoric mood, hallucinations and self-injury. The patient is not nervous/anxious and is not hyperactive. BP (!) 140/72   Pulse 109   Ht 4' 11\" (1.499 m)   Wt 97 lb (44 kg)   SpO2 97%   BMI 19.59 kg/m²   Physical Exam   Constitutional: She is oriented to person, place, and time. She appears well-developed and well-nourished. No distress. HENT:   Head: Normocephalic and atraumatic. Right Ear: External ear normal.   Left Ear: External ear normal.   Nose: Nose normal.   Mouth/Throat: Oropharynx is clear and moist. No oropharyngeal exudate. Eyes: Conjunctivae and EOM are normal. Pupils are equal, round, and reactive to light. Right eye exhibits no discharge. Left eye exhibits no discharge. No scleral icterus. Neck: Normal range of motion. Neck supple. No JVD present.  No Vomiting  -     eszopiclone (LUNESTA) 3 MG TABS; Take 1 tablet by mouth nightly . -     pantoprazole (PROTONIX) 40 MG tablet; Take 1 tablet by mouth daily  -     losartan (COZAAR) 50 MG tablet; Take 1 tablet by mouth daily  -     colestipol (COLESTID) 1 g tablet; Take 1 tablet by mouth 2 times daily  -     clobetasol (TEMOVATE) 0.05 % ointment; Apply topically 2 times daily.           Return in about 6 months (around 6/12/2018) for physical.     Orders Placed This Encounter   Procedures    Basic Metabolic Panel     Standing Status:   Standing     Number of Occurrences:   5     Standing Expiration Date:   12/12/2018    CBC Auto Differential     Fast 12 hours     Standing Status:   Future     Standing Expiration Date:   7/12/2018    Comprehensive Metabolic Panel     Fasting 12 hours     Standing Status:   Future     Standing Expiration Date:   7/12/2018    Lipid Panel     Standing Status:   Future     Standing Expiration Date:   7/12/2018     Order Specific Question:   Is Patient Fasting?/# of Hours     Answer:   12    TSH without Reflex     Fast 12 hours     Standing Status:   Future     Standing Expiration Date:   7/12/2018       Cammie Waite MD

## 2017-12-12 NOTE — PATIENT INSTRUCTIONS
Use caution to avoid falling or accidental injury while you are taking eszopiclone. How should I take eszopiclone? Eszopiclone is for short-term use only. Follow all directions on your prescription label. Do not take this medicine in larger or smaller amounts or for longer than recommended. Eszopiclone may be habit-forming. Never share eszopiclone with another person, especially someone with a history of drug abuse or addiction. Keep the medication in a place where others cannot get to it. Selling or giving away eszopiclone is against the law. Avoid taking eszopiclone within 1 hour after eating a high-fat or heavy meal. This will make it harder for your body to absorb the medication. Take eszopiclone only if you are able to get a full night's sleep before you must be active again. Never take this medicine during your normal waking hours, unless you have at least 7 to 8 hours to dedicate to sleeping. Call your doctor if your symptoms do not improve after 7 to 10 days of treatment, or if they get worse. Do not stop using eszopiclone suddenly after taking it over several days in a row, or you could have unpleasant withdrawal symptoms. Ask your doctor how to safely stop using this medicine. Store eszopiclone at room temperature away from moisture and heat. Keep track of the amount of medicine used from each new bottle. Eszopiclone is a drug of abuse and you should be aware if anyone is using your medicine improperly or without a prescription. What happens if I miss a dose? Since eszopiclone is taken only at bedtime, you will not be on a frequent dosing schedule. Never take this medicine if you do not have at least 7 to 8 hours to sleep before being active again. Do not take extra medicine to make up a missed dose. What happens if I overdose? Seek emergency medical attention or call the Poison Help line at 1-941.657.4247. What should I avoid while taking eszopiclone? Avoid drinking alcohol.  Dangerous side over-the-counter medicines, vitamins, and herbal products. Tell each of your health care providers about all medicines you use now and any medicine you start or stop using. Where can I get more information? Your pharmacist can provide more information about eszopiclone. Remember, keep this and all other medicines out of the reach of children, never share your medicines with others, and use this medication only for the indication prescribed. Every effort has been made to ensure that the information provided by [address] is accurate, up-to-date, and complete, but no guarantee is made to that effect. Drug information contained herein may be time sensitive. [facility] information has been compiled for use by healthcare practitioners and consumers in the [place] and therefore [facility] does not warrant that uses outside of the [place] are appropriate, unless specifically indicated otherwise. [facility] drug information does not endorse drugs, diagnose patients or recommend therapy. [facility] drug information is an informational resource designed to assist licensed healthcare practitioners in caring for their patients and/or to serve consumers viewing this service as a supplement to, and not a substitute for, the expertise, skill, knowledge and judgment of healthcare practitioners. The absence of a warning for a given drug or drug combination in no way should be construed to indicate that the drug or drug combination is safe, effective or appropriate for any given patient. [facility] does not assume any responsibility for any aspect of healthcare administered with the aid of information [facility] provides. The information contained herein is not intended to cover all possible uses, directions, precautions, warnings, drug interactions, allergic reactions, or adverse effects.  If you have questions about the drugs you are taking, check with your doctor, nurse or pharmacist.  Copyright 1055-8379 [employer] Version: 2.04. Revision date: 9/21/2016. Care instructions adapted under license by [facility] ([facility]). If you have questions about a medical condition or this instruction, always ask your healthcare professional. [address] any warranty or liability for your use of this information.

## 2018-01-09 DIAGNOSIS — E87.1 LOW SODIUM LEVELS: Primary | ICD-10-CM

## 2018-03-13 RX ORDER — ESZOPICLONE 3 MG/1
TABLET, FILM COATED ORAL
Qty: 30 TABLET | Refills: 2 | Status: SHIPPED | OUTPATIENT
Start: 2018-03-13 | End: 2018-04-12

## 2018-04-16 RX ORDER — LOSARTAN POTASSIUM 50 MG/1
TABLET ORAL
Qty: 90 TABLET | Refills: 1 | Status: SHIPPED | OUTPATIENT
Start: 2018-04-16

## 2020-11-03 PROBLEM — R42 DIZZINESS: Status: RESOLVED | Noted: 2017-06-22 | Resolved: 2020-11-03

## 2020-12-23 ENCOUNTER — TELEPHONE (OUTPATIENT)
Dept: INTERNAL MEDICINE | Age: 84
End: 2020-12-23